# Patient Record
Sex: MALE | Race: BLACK OR AFRICAN AMERICAN | NOT HISPANIC OR LATINO | Employment: UNEMPLOYED | URBAN - METROPOLITAN AREA
[De-identification: names, ages, dates, MRNs, and addresses within clinical notes are randomized per-mention and may not be internally consistent; named-entity substitution may affect disease eponyms.]

---

## 2023-10-28 ENCOUNTER — HOSPITAL ENCOUNTER (EMERGENCY)
Facility: HOSPITAL | Age: 43
Discharge: HOME | End: 2023-10-28
Attending: STUDENT IN AN ORGANIZED HEALTH CARE EDUCATION/TRAINING PROGRAM
Payer: COMMERCIAL

## 2023-10-28 VITALS
SYSTOLIC BLOOD PRESSURE: 148 MMHG | OXYGEN SATURATION: 97 % | BODY MASS INDEX: 22.08 KG/M2 | WEIGHT: 120 LBS | HEART RATE: 87 BPM | DIASTOLIC BLOOD PRESSURE: 77 MMHG | TEMPERATURE: 97.9 F | RESPIRATION RATE: 15 BRPM | HEIGHT: 62 IN

## 2023-10-28 DIAGNOSIS — F10.920 ALCOHOLIC INTOXICATION WITHOUT COMPLICATION (CMS-HCC): Primary | ICD-10-CM

## 2023-10-28 LAB
GLUCOSE BLD MANUAL STRIP-MCNC: 108 MG/DL (ref 74–99)
GLUCOSE BLD MANUAL STRIP-MCNC: 73 MG/DL (ref 74–99)

## 2023-10-28 PROCEDURE — 82947 ASSAY GLUCOSE BLOOD QUANT: CPT

## 2023-10-28 PROCEDURE — 99283 EMERGENCY DEPT VISIT LOW MDM: CPT | Performed by: STUDENT IN AN ORGANIZED HEALTH CARE EDUCATION/TRAINING PROGRAM

## 2023-10-28 RX ORDER — LANOLIN ALCOHOL/MO/W.PET/CERES
100 CREAM (GRAM) TOPICAL ONCE
Status: DISCONTINUED | OUTPATIENT
Start: 2023-10-28 | End: 2023-10-28 | Stop reason: HOSPADM

## 2023-10-28 RX ORDER — MULTIVIT-MIN/IRON FUM/FOLIC AC 7.5 MG-4
1 TABLET ORAL ONCE
Status: DISCONTINUED | OUTPATIENT
Start: 2023-10-28 | End: 2023-10-28 | Stop reason: HOSPADM

## 2023-10-28 RX ORDER — BISMUTH SUBSALICYLATE 262 MG
1 TABLET,CHEWABLE ORAL ONCE
Status: DISCONTINUED | OUTPATIENT
Start: 2023-10-28 | End: 2023-10-28 | Stop reason: CLARIF

## 2023-10-28 SDOH — HEALTH STABILITY: MENTAL HEALTH: HAVE YOU EVER DONE ANYTHING, STARTED TO DO ANYTHING, OR PREPARED TO DO ANYTHING TO END YOUR LIFE?: NO

## 2023-10-28 SDOH — HEALTH STABILITY: MENTAL HEALTH: HAVE YOU ACTUALLY HAD ANY THOUGHTS OF KILLING YOURSELF?: NO

## 2023-10-28 SDOH — HEALTH STABILITY: MENTAL HEALTH: SUICIDE ASSESSMENT: ADULT (C-SSRS)

## 2023-10-28 SDOH — HEALTH STABILITY: MENTAL HEALTH: HAVE YOU WISHED YOU WERE DEAD OR WISHED YOU COULD GO TO SLEEP AND NOT WAKE UP?: NO

## 2023-10-28 ASSESSMENT — LIFESTYLE VARIABLES
EVER FELT BAD OR GUILTY ABOUT YOUR DRINKING: YES
VISUAL DISTURBANCES: NOT PRESENT
AUDITORY DISTURBANCES: NOT PRESENT
HEADACHE, FULLNESS IN HEAD: NOT PRESENT
AGITATION: NORMAL ACTIVITY
ANXIETY: NO ANXIETY, AT EASE
HAVE YOU EVER FELT YOU SHOULD CUT DOWN ON YOUR DRINKING: YES
TREMOR: NO TREMOR
REASON UNABLE TO ASSESS: NO
ORIENTATION AND CLOUDING OF SENSORIUM: ORIENTED AND CAN DO SERIAL ADDITIONS
EVER HAD A DRINK FIRST THING IN THE MORNING TO STEADY YOUR NERVES TO GET RID OF A HANGOVER: NO
HAVE PEOPLE ANNOYED YOU BY CRITICIZING YOUR DRINKING: YES
TOTAL SCORE: 0
PAROXYSMAL SWEATS: NO SWEAT VISIBLE
NAUSEA AND VOMITING: NO NAUSEA AND NO VOMITING

## 2023-10-28 ASSESSMENT — ABNORMAL INVOLUNTARY MOVEMENT SCALE (AIMS)
PATIENT_WEARS_DENTURES: NO
JAW: NONE, NORMAL
CURRENT_PROBLEMS_TEETH_DENTURES: NO
LIPS_PARIETAL: NONE, NORMAL
UPPER_BODY_EXTREMITIES: NONE, NORMAL
LOWER_BODY_EXTREMITIES: NONE, NORMAL
NECK_SHOULDER_HIPS: NONE, NORMAL
TONGUE: NONE, NORMAL
FACIAL_EXPRESSION_MUSCLES: NONE, NORMAL
AIMS_PATIENT_AWARENESS: NO AWARENESS
AIMS_PATIENT_INCAPACITATION: NONE, NORMAL

## 2023-10-28 ASSESSMENT — COLUMBIA-SUICIDE SEVERITY RATING SCALE - C-SSRS
6. HAVE YOU EVER DONE ANYTHING, STARTED TO DO ANYTHING, OR PREPARED TO DO ANYTHING TO END YOUR LIFE?: NO
1. IN THE PAST MONTH, HAVE YOU WISHED YOU WERE DEAD OR WISHED YOU COULD GO TO SLEEP AND NOT WAKE UP?: NO
2. HAVE YOU ACTUALLY HAD ANY THOUGHTS OF KILLING YOURSELF?: NO

## 2023-10-28 ASSESSMENT — PAIN SCALES - GENERAL: PAINLEVEL_OUTOF10: 0 - NO PAIN

## 2023-10-28 ASSESSMENT — PAIN - FUNCTIONAL ASSESSMENT: PAIN_FUNCTIONAL_ASSESSMENT: 0-10

## 2023-10-28 NOTE — ED TRIAGE NOTES
Pt brought in by [employer] by [employer] bus stop  for intoxication, admits to drinking alcohol and doing drugs unable to understand what drugs pt states he used

## 2023-10-28 NOTE — Clinical Note
Moshe Vela was seen and treated in our emergency department on 10/28/2023.  He may return to work on 10/30/2023.       If you have any questions or concerns, please don't hesitate to call.      Chavez Radford MD

## 2023-10-28 NOTE — ED PROVIDER NOTES
HPI   Chief Complaint   Patient presents with    Alcohol Intoxication       HPI      patient is a 43-year-old male with a past medical history of alcohol use disorder presenting to the emergency department with intoxication.  Patient was reportedly found at a bus stop significantly intoxicated and slurring his words to the point that he was unable to be understood.  Was taken to the emergency department for further evaluation.  Patient states that he drank a large amount of alcohol, but is unsure on the exact amount.  Denies any other intoxicating substances.  Is alert and oriented x3, but slurring his words almost to the point of being unintelligible, and appears at least somewhat confused.  Patient denies any recent trauma or falls.  Denies any chest pain, abdominal pain, nausea, vomiting, fever, chills.               [place] Coma Scale Score: 15                  Patient History   Past Medical History:   Diagnosis Date    Other specified health status     No pertinent past medical history     Past Surgical History:   Procedure Laterality Date    OTHER SURGICAL HISTORY  01/23/2019    Hernia repair     No family history on file.  Social History     Tobacco Use    Smoking status: Not on file    Smokeless tobacco: Not on file   Substance Use Topics    Alcohol use: Not on file    Drug use: Not on file       Physical Exam   ED Triage Vitals [10/28/23 1250]   Temp Heart Rate Resp BP   36.6 °C (97.9 °F) 109 16 (!) 134/96      SpO2 Temp Source Heart Rate Source Patient Position   99 % Tympanic -- Sitting      BP Location FiO2 (%)     Left arm --       Physical Exam  Constitutional:       Appearance: He is not toxic-appearing or diaphoretic.   HENT:      Head: Normocephalic and atraumatic.      Nose: Nose normal.   Eyes:      General: No scleral icterus.        Right eye: No discharge.         Left eye: No discharge.      Conjunctiva/sclera: Conjunctivae normal.   Cardiovascular:      Rate and Rhythm: Normal rate.       "Heart sounds: No murmur heard.     No friction rub. No gallop.   Pulmonary:      Effort: No respiratory distress.      Breath sounds: Normal breath sounds.   Abdominal:      General: There is no distension.      Palpations: Abdomen is soft.   Musculoskeletal:         General: No deformity or signs of injury.      Cervical back: Neck supple. No rigidity.   Skin:     General: Skin is warm and dry.   Neurological:      General: No focal deficit present.      Mental Status: He is alert and oriented to person, place, and time.      Comments:  bilateral horizontal nystagmus is present.  Pupils are equal and equally reactive to light bilaterally.  Patient moves all 4 extremities without obvious deficit.  Responsive to light touch in all 4 extremities.   Psychiatric:         Mood and Affect: Mood normal.         Behavior: Behavior normal.         ED Course & MDM   Diagnoses as of 10/28/23 1711   Alcoholic intoxication without complication (CMS/[facility])       Medical Decision Making    Patient is a 43-year-old male presenting to the emergency department with alcohol intoxication.  Patient had no external signs of trauma on exam and the palpated calvarium had no obvious depressions or lacerations.  Patient himself denied any acute trauma, although a somewhat unreliable historian.  Patient was observed in the emergency department.  Patient's point-of-care glucose was slightly low and patient was given food and tolerated p.o. challenge.  Patient showed continuously improving mental status.  On my reevaluation when the patient was clinically sober he denied any history of recent MVC's, trauma, falls.  Stated that he felt generally sore all over and \"like I have a hangover\".  However denied any specific chest pain, abdominal pain, nausea, vomiting.  Patient states that the last thing he remembers was getting ready to go to work and that he must of had a drink somewhere along the way.  Did state that he had been suffering from alcohol " use disorder for a long time and was trying to seek resources as an outpatient, but was somewhat unsuccessful.  Patient was offered [facility] consultation and they were able to speak with the patient.  However, he did not want to give all the information necessary for intake to a treatment center.  Given this, they are able to provide outpatient resource list and follow-up information, but unable to place the patient in a inpatient treatment facility.  Risk and benefits of this were discussed with the patient who stated that was his desire.  Patient ambulated without support, at the time of disposition had normal speech and normal thought content.  Will be discharged in stable condition.    Procedure  Procedures     Wallace Holbrook MD  Resident  10/28/23 6601

## 2024-01-26 ENCOUNTER — HOSPITAL ENCOUNTER (EMERGENCY)
Facility: HOSPITAL | Age: 44
Discharge: HOME | End: 2024-01-26
Attending: EMERGENCY MEDICINE
Payer: COMMERCIAL

## 2024-01-26 VITALS
TEMPERATURE: 97.9 F | RESPIRATION RATE: 18 BRPM | HEIGHT: 65 IN | SYSTOLIC BLOOD PRESSURE: 111 MMHG | WEIGHT: 130 LBS | HEART RATE: 96 BPM | OXYGEN SATURATION: 97 % | DIASTOLIC BLOOD PRESSURE: 75 MMHG | BODY MASS INDEX: 21.66 KG/M2

## 2024-01-26 DIAGNOSIS — F10.920 ALCOHOLIC INTOXICATION WITHOUT COMPLICATION (CMS-HCC): Primary | ICD-10-CM

## 2024-01-26 PROCEDURE — 99284 EMERGENCY DEPT VISIT MOD MDM: CPT | Performed by: EMERGENCY MEDICINE

## 2024-01-26 PROCEDURE — 99281 EMR DPT VST MAYX REQ PHY/QHP: CPT | Performed by: EMERGENCY MEDICINE

## 2024-01-26 ASSESSMENT — COLUMBIA-SUICIDE SEVERITY RATING SCALE - C-SSRS
2. HAVE YOU ACTUALLY HAD ANY THOUGHTS OF KILLING YOURSELF?: NO
6. HAVE YOU EVER DONE ANYTHING, STARTED TO DO ANYTHING, OR PREPARED TO DO ANYTHING TO END YOUR LIFE?: NO
1. IN THE PAST MONTH, HAVE YOU WISHED YOU WERE DEAD OR WISHED YOU COULD GO TO SLEEP AND NOT WAKE UP?: NO

## 2024-01-26 ASSESSMENT — PAIN - FUNCTIONAL ASSESSMENT: PAIN_FUNCTIONAL_ASSESSMENT: 0-10

## 2024-01-26 ASSESSMENT — PAIN SCALES - GENERAL: PAINLEVEL_OUTOF10: 0 - NO PAIN

## 2024-01-26 NOTE — ED PROVIDER NOTES
CC: Fall and Alcohol Intoxication     HPI:  Patient is a 43-year-old male with history of alcohol use disorder presenting to the ED for intoxication.  Patient was reportedly dropped off by the [place] [employer] in triage and PD left.  Patient endorses drinking and thinks he may have fell but denies any pain or injury.  No medical complaints.  Would like to be tested for STIs.  Denies any current symptoms.      Limitations to History: Uncooperative    Records Reviewed:  Recent available ED and inpatient notes reviewed in EMR.    PMHx/PSHx:  Per HPI.   - has a past medical history of Other specified health status.  - has a past surgical history that includes Other surgical history (01/23/2019).    Medications:  Reviewed in EMR. See EMR for complete list of medications and doses.    Allergies:  Patient has no known allergies.    Social History:  - Tobacco:  has no history on file for tobacco use.   - Alcohol:  has no history on file for alcohol use.   - Illicit Drugs:  has no history on file for drug use.     ROS:  Per HPI.     ???????????????????????????????????????????????????????????????  Triage Vitals:  T 36.6 °C (97.9 °F)  HR 96  /75  RR 18  O2 97 % None (Room air)    PHYSICAL EXAM:   VS: As documented in the triage note and EMR flowsheet from this visit were reviewed.  Gen: Well developed.  Appears comfortable.  Eyes: pupils equally round, Clear scerla.  HENT: NC/AT, Mucosal membranes moist.   Neck: Supple, tracheal midline  Resp: Non-labored breathing on RA, no stridor  CV: regular rate, extremities well perfused  Abd: Soft, non-distended, non-tender  Skin: WWP. No systemic rashes or lesions.  MSK: normal muscle bulk, no obvious joint swelling in extremities  Neuro:  AAOx3, speech fluent, MAEx4, no facial droop  Psych: Appropriate mood and affect  ???????????????????????????????????????????????????????????????    ED Labs/Imaging:   Labs Reviewed - No data to display    No orders to display          ED Course & MDM   Diagnoses as of 01/27/24 1338   Alcoholic intoxication without complication (CMS/[facility])           Medical Decision Making  This is a 43-year-old male presenting to the ED for public intoxication.  Patient arrives hemodynamically stable and not in acute distress.  Patient requesting discharge on evaluation.  Patient does endorse drinking alcohol but patient ambulating appropriately and has capacity.  Patient declines [facility] services.  Would like to be tested for STDs which were ordered however patient left prior to those being formally collected.  Patient was given paperwork with primary care follow-up and told he should be plugged in with a primary care.  Was told to come back if he would like help with his alcohol use.  Discharged in stable condition.    Social Determinants Limiting Care:  None identified    Disposition:  As a result of the work-up, patient was discharged home.  They were informed of their diagnosis and instructed to come back with any concerns or worsening of condition and was agreeable to the plan as discussed above.  The patient was given the opportunity to ask questions.  All of the patient's questions were answered.  The patient remained stable under my care.    Patient seen and discussed with attending physician.    Radha Hernadez MD PGY3  Emergency Medicine      Procedures ? [employer] last updated 1/27/2024 1:38 PM        Radha Hernadez MD  Resident  01/27/24 6483

## 2024-01-26 NOTE — ED TRIAGE NOTES
Pt presents to ED intoxicated. He was dropped off by [employer] in triage and PD left. Pt said he thinks he fell but can't recall event and he is having penile pain.

## 2024-03-03 ENCOUNTER — HOSPITAL ENCOUNTER (EMERGENCY)
Facility: HOSPITAL | Age: 44
Discharge: HOME | End: 2024-03-03
Attending: EMERGENCY MEDICINE
Payer: COMMERCIAL

## 2024-03-03 VITALS
DIASTOLIC BLOOD PRESSURE: 80 MMHG | SYSTOLIC BLOOD PRESSURE: 124 MMHG | RESPIRATION RATE: 16 BRPM | TEMPERATURE: 98.6 F | OXYGEN SATURATION: 100 % | HEART RATE: 80 BPM

## 2024-03-03 DIAGNOSIS — F10.920 ALCOHOLIC INTOXICATION WITHOUT COMPLICATION (CMS-HCC): Primary | ICD-10-CM

## 2024-03-03 DIAGNOSIS — R30.0 DYSURIA: ICD-10-CM

## 2024-03-03 PROCEDURE — 99281 EMR DPT VST MAYX REQ PHY/QHP: CPT

## 2024-03-03 PROCEDURE — 99284 EMERGENCY DEPT VISIT MOD MDM: CPT | Performed by: EMERGENCY MEDICINE

## 2024-03-03 ASSESSMENT — LIFESTYLE VARIABLES
HAVE PEOPLE ANNOYED YOU BY CRITICIZING YOUR DRINKING: NO
EVER FELT BAD OR GUILTY ABOUT YOUR DRINKING: NO
HAVE YOU EVER FELT YOU SHOULD CUT DOWN ON YOUR DRINKING: NO
EVER HAD A DRINK FIRST THING IN THE MORNING TO STEADY YOUR NERVES TO GET RID OF A HANGOVER: NO

## 2024-03-03 NOTE — DISCHARGE INSTRUCTIONS
You were seen today because you were intoxicated. We did get urine and HIV/syphilis testing on you because you were having testicular pain, these labs were all negative. You should try to drink less. Please return to the emergency department if you would like help with this or for any other concerns.

## 2024-03-03 NOTE — ED PROVIDER NOTES
HPI   No chief complaint on file.      43-year-old male with no stated past medical history who presents today for reported alcohol intoxication.  Patient states that he has been drinking today, but came in because he was having discharge from his penis and testicular pain.  He states that he has been having this for a number of days, but cannot remember specifically when.  He denies any purulent discharge or hematuria, but does endorse urinary frequency and dysuria.  He states that he has pain in both of his testicles, but denies any new lumps swelling or lesions.  He endorses unprotected sex with sex workers in the past.  Denies any chest pain or shortness of breath, no nausea vomiting diarrhea abdominal pain.  Denies any fevers at home.  Denies any difficulty walking, numbness weakness or tingling in his arms or legs.  Is unsure when the last time he got tested for STDs was.  Patient's history is somewhat difficult to ascertain secondary to his level of intoxication.                          No data recorded                   Patient History   Past Medical History:   Diagnosis Date    Other specified health status     No pertinent past medical history     Past Surgical History:   Procedure Laterality Date    OTHER SURGICAL HISTORY  01/23/2019    Hernia repair     No family history on file.  Social History     Tobacco Use    Smoking status: Unknown    Smokeless tobacco: Not on file   Substance Use Topics    Alcohol use: Not on file    Drug use: Not on file       Physical Exam   ED Triage Vitals   Temp Pulse Resp BP   -- -- -- --      SpO2 Temp src Heart Rate Source Patient Position   -- -- -- --      BP Location FiO2 (%)     -- --       Physical Exam  Vitals and nursing note reviewed.   Constitutional:       Appearance: He is not ill-appearing, toxic-appearing or diaphoretic.      Comments: Somnolent but arousable, smells of alcohol   HENT:      Head: Normocephalic and atraumatic.      Nose: Nose normal. No  congestion or rhinorrhea.      Mouth/Throat:      Mouth: Mucous membranes are moist.      Pharynx: Oropharynx is clear. No oropharyngeal exudate or posterior oropharyngeal erythema.   Eyes:      General: No scleral icterus.     Extraocular Movements: Extraocular movements intact.      Pupils: Pupils are equal, round, and reactive to light.   Cardiovascular:      Rate and Rhythm: Normal rate and regular rhythm.      Pulses: Normal pulses.      Heart sounds: Normal heart sounds. No murmur heard.     No gallop.   Pulmonary:      Effort: Pulmonary effort is normal.      Breath sounds: Normal breath sounds. No wheezing, rhonchi or rales.   Abdominal:      General: Abdomen is flat.      Palpations: Abdomen is soft. There is no mass.      Tenderness: There is no abdominal tenderness. There is no guarding or rebound.   Genitourinary:     Comments: Some hypopigmentation of the penis, without any lesions or rashes.  No visible discharge, minimal tenderness to palpation the bilateral testicles, no palpable hernias on screening.  Musculoskeletal:         General: No swelling, tenderness, deformity or signs of injury. Normal range of motion.      Cervical back: Normal range of motion and neck supple. No rigidity or tenderness.   Skin:     General: Skin is warm and dry.      Findings: No erythema.      Comments: Hyperpigmented scaly rash on bilateral hands otherwise no notable rash lesion or abrasion or laceration of the face or upper extremities.   Neurological:      General: No focal deficit present.      Mental Status: He is oriented to person, place, and time.      Cranial Nerves: No cranial nerve deficit.      Motor: No weakness.      Gait: Gait normal.   Psychiatric:      Comments: Labile mood, otherwise no SI no HI no AVH.         ED Course & [facility]   ED Course as of 03/03/24 1631   Sun Mar 03, 2024   1613 States that he presented today because he has been having all over body aches in random places for the last 2 months.  He  describes the pain is in his arms and legs and chest without any consistency, aggravating or alleviating factors.  He states he was once put on potassium but is not taking it anymore.  He denies being on any daily medications.  He states that he smokes cigarettes denies any alcohol or drug use but did admit to drinking half a pint of alcohol today.  Patient has been able to tolerate p.o. here despite feeling nauseated at home.  Patient is otherwise very well-appearing and only mildly intoxicated.  Patient is able to have a sober ride he is stable for discharge. [YT]      ED Course User Index  [YT] Sandy Lin MD         Diagnoses as of 03/03/24 1631   Alcoholic intoxication without complication (CMS/[facility])   Dysuria       Medical Decision Making  43-year-old male with no stated past medical history presents today for alcohol intoxication.  Patient appears quite intoxicated on exam, so we will observe him until he is clinically sober or have a family member come pick him up.  Additionally, given the fact that he has been complaining about dysuria with risk factors, we will also get a urine as well as a GC and trichomoniasis.  Will also get RPR and HIV to screen for syphilis.  Patient's labs demonstrated no abnormalities, so does not require any treatment at this time.  Patient did sober up somewhat, but was still somewhat intoxicated and requested to go home. I did witness him fall into a chair without any headstrike or LOC when we attempted to ambulate him. I did reexamine the patient after this, and felt that he had no traumatic injuries. I did recommend that he return here to the hospital if you develop any pain nurse any additional injuries after becoming more sober. Patient's brother was able to pick him up, all questions answered to discharge, return precaution provided.        Procedure  Procedures     Ren Aguilar MD  Resident  03/03/24 5608